# Patient Record
Sex: FEMALE | Race: WHITE | Employment: FULL TIME | URBAN - METROPOLITAN AREA
[De-identification: names, ages, dates, MRNs, and addresses within clinical notes are randomized per-mention and may not be internally consistent; named-entity substitution may affect disease eponyms.]

---

## 2017-02-23 ENCOUNTER — OFFICE VISIT (OUTPATIENT)
Dept: FAMILY MEDICINE CLINIC | Facility: CLINIC | Age: 37
End: 2017-02-23

## 2017-02-23 VITALS
SYSTOLIC BLOOD PRESSURE: 122 MMHG | DIASTOLIC BLOOD PRESSURE: 80 MMHG | TEMPERATURE: 98 F | HEART RATE: 80 BPM | BODY MASS INDEX: 25.88 KG/M2 | HEIGHT: 66 IN | RESPIRATION RATE: 16 BRPM | WEIGHT: 161 LBS

## 2017-02-23 DIAGNOSIS — R89.9 ABNORMAL LABORATORY TEST RESULT: Primary | ICD-10-CM

## 2017-02-23 PROCEDURE — 99212 OFFICE O/P EST SF 10 MIN: CPT | Performed by: FAMILY MEDICINE

## 2017-02-23 RX ORDER — GARLIC EXTRACT 500 MG
1 CAPSULE ORAL DAILY
COMMUNITY

## 2017-02-23 NOTE — PROGRESS NOTES
Patient had routine labs done at work. Her vitamin D level was slightly low. She is taking vitamin D supplements. I have advised her to take 1000 units per day.   Her cholesterol blood sugar chemistry profile and CBC and thyroid were all perfectly normal

## 2018-01-22 ENCOUNTER — OFFICE VISIT (OUTPATIENT)
Dept: FAMILY MEDICINE CLINIC | Facility: CLINIC | Age: 38
End: 2018-01-22

## 2018-01-22 VITALS
OXYGEN SATURATION: 99 % | WEIGHT: 158.81 LBS | DIASTOLIC BLOOD PRESSURE: 80 MMHG | TEMPERATURE: 100 F | RESPIRATION RATE: 16 BRPM | SYSTOLIC BLOOD PRESSURE: 122 MMHG | HEART RATE: 80 BPM | HEIGHT: 66.5 IN | BODY MASS INDEX: 25.22 KG/M2

## 2018-01-22 DIAGNOSIS — R68.89 FLU-LIKE SYMPTOMS: Primary | ICD-10-CM

## 2018-01-22 PROCEDURE — 99213 OFFICE O/P EST LOW 20 MIN: CPT | Performed by: NURSE PRACTITIONER

## 2018-01-22 RX ORDER — OSELTAMIVIR PHOSPHATE 75 MG/1
75 CAPSULE ORAL 2 TIMES DAILY
Qty: 10 CAPSULE | Refills: 0 | Status: SHIPPED | OUTPATIENT
Start: 2018-01-22 | End: 2018-01-27

## 2018-01-22 NOTE — PATIENT INSTRUCTIONS
Take tamiflu as prescribed, take with food. This will decrease severity and duration of symptoms and decrease viral shedding. Push fluids, rest.   Tylenol or ibuprofen over the counter for discomfort.   Return if symptoms worsen or do not improve in 3-5 da

## 2018-01-22 NOTE — PROGRESS NOTES
Chief complaint:  Patient presents with:  Nasal Congestion: head congestion, body aches, fever, coughing, chills. started yesterday      HPI:   Douglas De Los Santos is a 40year old female who presents for upper respiratory symptoms for  1  days.  C/o: fever conjunctiva are clear, no injection or discharge, no orbital edema or erythema  HEAD: atraumatic, normocephalic, no sinus tenderness on palpation  ENT: TMs pearly, no bulging, no retraction, no fluid, landmarks present.  nares patent, clear thin nasal mucou

## (undated) NOTE — MR AVS SNAPSHOT
[address]  [place] [address] [id number]  [phone]               Thank you for choosing us for your health care visit with Carlos Govea MD.  We are glad to serve you and happy to provide you with this summary of your Zip Code and Date of Birth to complete the sign-up process. If you do not sign up before the expiration date, you must request a new code. Your unique Wi[employer]idet Access Code:  [facility]  Expires: 4/24/2017  5:43 PM    If you have questions, you can c

## (undated) NOTE — LETTER
Date: 1/22/2018    Patient Name: Eren Elliott          To Whom it may concern: This letter has been written at the patient's request. The above patient was seen at the [facility] for treatment of a medical condition.     This patient